# Patient Record
Sex: FEMALE | Race: WHITE | NOT HISPANIC OR LATINO | Employment: FULL TIME | URBAN - METROPOLITAN AREA
[De-identification: names, ages, dates, MRNs, and addresses within clinical notes are randomized per-mention and may not be internally consistent; named-entity substitution may affect disease eponyms.]

---

## 2017-11-28 ENCOUNTER — HOSPITAL ENCOUNTER (EMERGENCY)
Facility: MEDICAL CENTER | Age: 32
End: 2017-11-28
Attending: EMERGENCY MEDICINE

## 2017-11-28 ENCOUNTER — APPOINTMENT (OUTPATIENT)
Dept: RADIOLOGY | Facility: MEDICAL CENTER | Age: 32
End: 2017-11-28
Attending: EMERGENCY MEDICINE

## 2017-11-28 VITALS
WEIGHT: 163.14 LBS | TEMPERATURE: 96.8 F | DIASTOLIC BLOOD PRESSURE: 98 MMHG | HEIGHT: 63 IN | OXYGEN SATURATION: 96 % | SYSTOLIC BLOOD PRESSURE: 138 MMHG | HEART RATE: 77 BPM | RESPIRATION RATE: 18 BRPM | BODY MASS INDEX: 28.91 KG/M2

## 2017-11-28 DIAGNOSIS — R51.9 NONINTRACTABLE HEADACHE, UNSPECIFIED CHRONICITY PATTERN, UNSPECIFIED HEADACHE TYPE: ICD-10-CM

## 2017-11-28 PROCEDURE — 700111 HCHG RX REV CODE 636 W/ 250 OVERRIDE (IP): Performed by: EMERGENCY MEDICINE

## 2017-11-28 PROCEDURE — 96374 THER/PROPH/DIAG INJ IV PUSH: CPT

## 2017-11-28 PROCEDURE — 96375 TX/PRO/DX INJ NEW DRUG ADDON: CPT

## 2017-11-28 PROCEDURE — 700105 HCHG RX REV CODE 258: Performed by: EMERGENCY MEDICINE

## 2017-11-28 PROCEDURE — 70450 CT HEAD/BRAIN W/O DYE: CPT

## 2017-11-28 PROCEDURE — 99284 EMERGENCY DEPT VISIT MOD MDM: CPT

## 2017-11-28 RX ORDER — DIPHENHYDRAMINE HYDROCHLORIDE 50 MG/ML
50 INJECTION INTRAMUSCULAR; INTRAVENOUS ONCE
Status: COMPLETED | OUTPATIENT
Start: 2017-11-28 | End: 2017-11-28

## 2017-11-28 RX ORDER — METOCLOPRAMIDE HYDROCHLORIDE 5 MG/ML
20 INJECTION INTRAMUSCULAR; INTRAVENOUS ONCE
Status: COMPLETED | OUTPATIENT
Start: 2017-11-28 | End: 2017-11-28

## 2017-11-28 RX ORDER — DEXAMETHASONE SODIUM PHOSPHATE 4 MG/ML
10 INJECTION, SOLUTION INTRA-ARTICULAR; INTRALESIONAL; INTRAMUSCULAR; INTRAVENOUS; SOFT TISSUE ONCE
Status: COMPLETED | OUTPATIENT
Start: 2017-11-28 | End: 2017-11-28

## 2017-11-28 RX ORDER — SODIUM CHLORIDE 9 MG/ML
1000 INJECTION, SOLUTION INTRAVENOUS ONCE
Status: COMPLETED | OUTPATIENT
Start: 2017-11-28 | End: 2017-11-28

## 2017-11-28 RX ADMIN — DIPHENHYDRAMINE HYDROCHLORIDE 50 MG: 50 INJECTION, SOLUTION INTRAMUSCULAR; INTRAVENOUS at 03:27

## 2017-11-28 RX ADMIN — SODIUM CHLORIDE 1000 ML: 9 INJECTION, SOLUTION INTRAVENOUS at 03:33

## 2017-11-28 RX ADMIN — METOCLOPRAMIDE 20 MG: 5 INJECTION, SOLUTION INTRAMUSCULAR; INTRAVENOUS at 03:27

## 2017-11-28 RX ADMIN — DEXAMETHASONE SODIUM PHOSPHATE 10 MG: 4 INJECTION, SOLUTION INTRAMUSCULAR; INTRAVENOUS at 03:27

## 2017-11-28 ASSESSMENT — PAIN SCALES - GENERAL: PAINLEVEL_OUTOF10: 8

## 2017-11-28 NOTE — ED NOTES
Pt ambulates to triage, gait steady, teary-eyed.  CC HA x 2 h, no neuro deficits.  Pt has been having increasing frequency and severity of HA x 1 month - taking tyenol - no improvement.  Plan of care provided.

## 2017-11-28 NOTE — ED PROVIDER NOTES
"ED Provider Note    ED Provider Note      Primary care provider: No primary care provider on file.    CHIEF COMPLAINT  Chief Complaint   Patient presents with   • Headache     increasing pain and frequency - 3 HA in last month - no change in stress       HPI  Alba Cooper is a 32 y.o. female who presents to the Emergency Department Chief complaint of headache. Patient reports that the headache began approximately an hour and half ago it awoke her suddenly from sleep. The headache is left retrobulbar but also in the left occiput and somewhat into the left side. She reports no photophobia no phonophobia she's had minor nausea without emesis. No dizziness confusion she's had no fevers no chills. She reports that she's had a similar headache approximately 2 weeks ago that was also sudden in onset. She reports that this headache is slightly worse this evening she sought no medical care for the previous headache. Denies pregnancy no chest abdominal pelvic pain otherwise well recently.    REVIEW OF SYSTEMS  10 systems reviewed and otherwise negative, pertinent positives and negatives listed in the history of present illness.    PAST MEDICAL HISTORY   patient denies    SURGICAL HISTORY  patient denies any surgical history    SOCIAL HISTORY  Social History   Substance Use Topics   • Smoking status: Current Some Day Smoker     Types: Cigars   • Smokeless tobacco: Never Used   • Alcohol use Yes      History   Drug Use No       FAMILY HISTORY  Non-Contributory    CURRENT MEDICATIONS  Home Medications     Reviewed by Jc Aparicio R.N. (Registered Nurse) on 11/28/17 at 0258  Med List Status: Complete   Medication Last Dose Status   Magnesium Gluconate (MAGNESIUM 27 PO)  Active   POTASSIUM CHLORIDE ER PO  Active                ALLERGIES  No Known Allergies    PHYSICAL EXAM  VITAL SIGNS: /98   Pulse 74   Temp 36 °C (96.8 °F)   Resp 16   Ht 1.6 m (5' 3\")   Wt 74 kg (163 lb 2.3 oz)   LMP 11/15/2017   SpO2 " 99%   BMI 28.90 kg/m²   Pulse ox interpretation: I interpret this pulse ox as normal.  Constitutional: Alert and oriented x 3,Minimal Distress  HEENT: Atraumatic normocephalic, pupils are equal round reactive to light extraocular movements are intact. The nares is clear, external ears are normal, mouth shows moist mucous membranes  Neck: Supple, no JVD no tracheal deviation, no pain with flexion and extension or axial rotation, no meningismus  Cardiovascular: Regular rate and rhythm no murmur rub or gallop 2+ pulses peripherally x4  Thorax & Lungs: No respiratory distress, no wheezes rales or rhonchi, No chest tenderness.   GI: Soft nontender nondistended positive bowel sounds, no peritoneal signs  Skin: Warm dry no acute rash or lesion  Musculoskeletal: Moving all extremities with full range and 5 of 5 strength, no acute  deformity  Neurologic: Cranial nerves III through XII are grossly intact, no sensory deficit, no cerebellar dysfunction   Psychiatric: Appropriate affect for situation at this time      DIAGNOSTIC STUDIES / PROCEDURES      RADIOLOGY  CT-HEAD W/O   Final Result      No acute intracranial abnormality is confirmed      Left pontine hypodensity most likely is artifactual. If symptoms warrant, however, MRI could be performed to further analyze        The radiologist's interpretation of all radiological studies have been reviewed by me.    COURSE & MEDICAL DECISION MAKING  Pertinent Labs & Imaging studies reviewed. (See chart for details)    3:04 AM - Patient seen and examined at bedside.        Prescription monitoring program queried and unremarkable.    Patient noted to have slightly elevated blood pressure likely circumstantial secondary to presenting complaint. Referred to primary care physician for further evaluation.     Patient was given IV fluids based on severe headache headache       Medical Decision Making: Patient presented as above Skin unremarkable for acute intracranial hemorrhage. I  "discussed the length with patient that I felt as though lumbar puncture was indicated in this case as I did have small concern for occult bleeding or possible sentinel bleed. Patient stated that her symptoms are completely resolved and she absolutely refused to complete lumbar puncture. She understood very clearly that I felt as though this could be a possible life-threatening situation for her and provide denying this evaluation studies could be putting her O Rife at risk. She still refused to complete procedure. She's feeling better vital signs improved discharged home in stable condition.  /98   Pulse 77   Temp 36 °C (96.8 °F)   Resp 18   Ht 1.6 m (5' 3\")   Wt 74 kg (163 lb 2.3 oz)   LMP 11/15/2017   SpO2 96%   BMI 28.90 kg/m²     [facility], Emergency Dept  [address] [facility] [address] [phone]    in 12-24 hours if symptoms persist,, immediately if symptoms worsen    [address] [address]  [phone]    for establishment of primary care, and recheck      FINAL IMPRESSION  1. Nonintractable headache, unspecified chronicity pattern, unspecified headache type           This dictation has been created using voice recognition software and/or scribes. The accuracy of the dictation is limited by the abilities of the software and the expertise of the scribes. I expect there may be some errors of grammar and possibly content. I made every attempt to manually correct the errors within my dictation. However, errors related to voice recognition software and/or scribes may still exist and should be interpreted within the appropriate context.            "

## 2018-07-14 ENCOUNTER — APPOINTMENT (OUTPATIENT)
Dept: RADIOLOGY | Facility: MEDICAL CENTER | Age: 33
End: 2018-07-14
Attending: EMERGENCY MEDICINE

## 2018-07-14 ENCOUNTER — HOSPITAL ENCOUNTER (EMERGENCY)
Facility: MEDICAL CENTER | Age: 33
End: 2018-07-14
Attending: EMERGENCY MEDICINE

## 2018-07-14 VITALS
HEART RATE: 60 BPM | WEIGHT: 164.24 LBS | BODY MASS INDEX: 29.09 KG/M2 | TEMPERATURE: 97.3 F | DIASTOLIC BLOOD PRESSURE: 82 MMHG | OXYGEN SATURATION: 97 % | RESPIRATION RATE: 15 BRPM | SYSTOLIC BLOOD PRESSURE: 116 MMHG

## 2018-07-14 DIAGNOSIS — S32.2XXA CLOSED FRACTURE OF SACRUM AND COCCYX, INITIAL ENCOUNTER (HCC): ICD-10-CM

## 2018-07-14 DIAGNOSIS — S32.10XA CLOSED FRACTURE OF SACRUM AND COCCYX, INITIAL ENCOUNTER (HCC): ICD-10-CM

## 2018-07-14 LAB — HCG UR QL: NEGATIVE

## 2018-07-14 PROCEDURE — 72100 X-RAY EXAM L-S SPINE 2/3 VWS: CPT

## 2018-07-14 PROCEDURE — 72220 X-RAY EXAM SACRUM TAILBONE: CPT

## 2018-07-14 PROCEDURE — 99284 EMERGENCY DEPT VISIT MOD MDM: CPT

## 2018-07-14 PROCEDURE — 81025 URINE PREGNANCY TEST: CPT

## 2018-07-14 PROCEDURE — 73564 X-RAY EXAM KNEE 4 OR MORE: CPT | Mod: RT

## 2018-07-14 RX ORDER — HYDROCODONE BITARTRATE AND ACETAMINOPHEN 5; 325 MG/1; MG/1
1-2 TABLET ORAL EVERY 4 HOURS PRN
Qty: 15 TAB | Refills: 0 | Status: SHIPPED | OUTPATIENT
Start: 2018-07-14 | End: 2018-07-17

## 2018-07-14 NOTE — ED PROVIDER NOTES
ED Provider Note    Scribed for Xiang Alarcon M.D. by Reagan Nichole. 7/14/2018  9:28 AM    Means of arrival: Walk-in  History obtained from: Patient  History limited by: None    CHIEF COMPLAINT  Chief Complaint   Patient presents with   • Knee Pain       HPI  Alba Cooper is a 32 y.o. female who presents to the Emergency Department complaining of right knee pain that radiates to her pelvis and tailbone that began four days ago. The patient slipped on a puddle of water and fell on her tailbone five days ago. She did not experience any immediate pelvis or tailbone pain. The next morning she began to experience the pain while laying in bed. Patient has been taking Tylenol with good pain relief. The pain is usually only severe in the mornings. The pain is excruciating when sitting or laying down. Her pain is a 3-4/10 in severity when standing. Her pain is not exacerbated by walking, palpation, or bending her knee.  This morning the pain was severe enough to prompt her to present here.  She denies chest pain or shortness of breath. There is no chance that she is pregnant and her last normal menstural period was two weeks ago.    REVIEW OF SYSTEMS  Pertinent positives include knee pain, pelvis pain, and tailbone pain. Pertinent negatives include no chest pain or shortness of breath.  E    PAST MEDICAL HISTORY   None noted    SURGICAL HISTORY  patient denies any surgical history    SOCIAL HISTORY  Social History   Substance Use Topics   • Smoking status: Current Some Day Smoker     Types: Cigars   • Smokeless tobacco: Never Used   • Alcohol use Yes      History   Drug Use No       FAMILY HISTORY  History reviewed. No pertinent family history.    CURRENT MEDICATIONS  No current facility-administered medications on file prior to encounter.      Current Outpatient Prescriptions on File Prior to Encounter   Medication Sig Dispense Refill   • Magnesium Gluconate (MAGNESIUM 27 PO) Take  by mouth.     •  POTASSIUM CHLORIDE ER PO Take  by mouth.         ALLERGIES  No Known Allergies    PHYSICAL EXAM  VITAL SIGNS: /79   Pulse 69   Temp 36.4 °C (97.6 °F)   Resp 16   Wt 74.5 kg (164 lb 3.9 oz)   SpO2 95%   BMI 29.09 kg/m²     Vital signs reviewed.  Constitutional:  Appears uncomfortable, walking about room  Cardiovascular: Regular rate and rhythm  Pulmonary/Chest: Clear to auscultation  Abdominal: Soft, non tender  Musculoskeletal: Pain and tenderness over the the right sacroiliac region  Neurological: Strength and sensation intact in all four extremities.  Skin: Warm, dry, no rash    LABS  Results for orders placed or performed during the hospital encounter of 07/14/18   POC URINE PREGNANCY   Result Value Ref Range    POC Urine Pregnancy Test Negative Negative     All labs reviewed by me.    RADIOLOGY  DX-LUMBAR SPINE-2 OR 3 VIEWS   Final Result         Mild degenerative disc disease at L2-3.      DX-KNEE COMPLETE 4+ RIGHT   Final Result      No acute findings.      DX-SACRUM AND COCCYX 2+   Final Result      Slight displacement of the distal coccyx, possibly related to recent trauma.        The radiologist's interpretation of all radiological studies have been reviewed by me.    COURSE & MEDICAL DECISION MAKING  Pertinent Labs & Imaging studies reviewed. (See chart for details) The patient's [facility] and past medical  records were reviewed    9:28 AM - Patient seen and examined at bedside. Ordered DX lumbar spine 2 or 3 views, DX sacrum and coccyx 2+ views, DX knee complete 4+ right, and POC urine pregnancy to evaluate her symptoms. The differential diagnoses include but are not limited to: sciatica vs fracture vs strain    11:06 AM Review of imaging reveals a slight displacement of the distal coccyx.    11:13 AM - Re-examined; The patient is resting in bed comfortably. I discussed her above findings and plans for discharge with a prescription for Norco. We dicussed the side effects of narcotic use  including constipation and their addictive nature. She was asked to continue alternative pain medication prior to using the Norco. She was advised to purchase a donut pillow in order to provide some pain relief when sitting. She was given a referral to [facility] to establish a primary care and instructed to return to the ED if her symptoms worsen. Patient understands and agrees.    I reviewed prescription monitoring program for patient's narcotic use before prescribing a scheduled drug.The patient will not drink alcohol nor drive with prescribed medications. The patient will return for new or worsening symptoms and is stable at the time of discharge.    In prescribing controlled substances to this patient, I certify that I have obtained and reviewed the medical history of Alba Cooper. I have also made a good kj effort to obtain applicable records from other providers who have treated the patient and records did not demonstrate any increased risk of substance abuse that would prevent me from prescribing controlled substances.     I have conducted a physical exam and documented it. I have reviewed Ms. Cooper’s prescription history as maintained by the [place] Prescription Monitoring Program.     I have assessed the patient’s risk for abuse, dependency, and addiction using the validated Opioid Risk Tool available at https://www.mdcalc.com/rhhjpw-khmp-eidq-ort-narcotic-abuse.     Given the above, I believe the benefits of controlled substance therapy outweigh the risks. The reasons for prescribing controlled substances include non-narcotic, oral analgesic alternatives have been inadequate for pain control. Accordingly, I have discussed the risk and benefits, treatment plan, and alternative therapies with the patient.       DISPOSITION:  Patient will be discharged home in stable condition.    FOLLOW UP:  your doctor    In 1 week      [facility]  [address] [place] [address]  [id number]  [phone]  In 1 week  As needed      OUTPATIENT MEDICATIONS:  New Prescriptions    HYDROCODONE-ACETAMINOPHEN (NORCO) 5-325 MG TAB PER TABLET    Take 1-2 Tabs by mouth every four hours as needed for up to 3 days.       FINAL IMPRESSION  1. Closed fracture of sacrum and coccyx, initial encounter ([facility])          Reagan ROBBINS (Scribe), am scribing for, and in the presence of, Xiang Alarcon M.D..    Electronically signed by: Reagan Nichole (Scribe), 7/14/2018    IXiang M.D. personally performed the services described in this documentation, as scribed by Reagan Nichole in my presence, and it is both accurate and complete.    The note accurately reflects work and decisions made by me.  Xiang Alarcon  7/14/2018  4:40 PM

## 2018-07-14 NOTE — DISCHARGE INSTRUCTIONS
Tailbone Injury  The tailbone is the small bone at the lower end of the backbone (spine). You may have stretched tissues, bruises, or a broken bone (fracture). These injuries can be painful. Most tailbone injuries get better on their own in 4-6 weeks.  Follow these instructions at home:  · Take medicines only as told by your doctor.  · If told, apply ice to the injured area.  ¨ Put ice in a plastic bag.  ¨ Place a towel between your skin and the bag.  ¨ Leave the ice on for 20 minutes, 2-3 times per day. Do this for the first 1-2 days.  · Sit on a large, rubber or inflated ring or cushion to lessen pain. Lean forward when you sit to help lessen pain.  · Avoid sitting in one place for a long time.  · Increase your activity as the pain allows.  · Do exercises as told by your doctor or physical therapist.  · If it is painful to poop, take medicine to help you poop (stool softeners) as told by your doctor.  · Eat foods that have plenty of fiber.  · Keep all follow-up visits as told by your doctor. This is important.  Contact a doctor if:  · Your pain gets worse.  · Pooping causes you pain.  · You cannot poop (constipation).  · You are leaking pee (urinary incontinence).  · You have a fever.  This information is not intended to replace advice given to you by your health care provider. Make sure you discuss any questions you have with your health care provider.  Document Released: 01/20/2012 Document Revised: 08/17/2017 Document Reviewed: 12/14/2015  [employer] Interactive Patient Education © 2017 [employer]

## 2018-12-04 ENCOUNTER — APPOINTMENT (OUTPATIENT)
Dept: RADIOLOGY | Facility: MEDICAL CENTER | Age: 33
End: 2018-12-04
Attending: EMERGENCY MEDICINE

## 2018-12-04 ENCOUNTER — HOSPITAL ENCOUNTER (EMERGENCY)
Facility: MEDICAL CENTER | Age: 33
End: 2018-12-04
Attending: EMERGENCY MEDICINE

## 2018-12-04 VITALS
HEIGHT: 63 IN | OXYGEN SATURATION: 99 % | BODY MASS INDEX: 29.57 KG/M2 | TEMPERATURE: 98.6 F | DIASTOLIC BLOOD PRESSURE: 79 MMHG | SYSTOLIC BLOOD PRESSURE: 126 MMHG | WEIGHT: 166.89 LBS | RESPIRATION RATE: 20 BRPM | HEART RATE: 77 BPM

## 2018-12-04 DIAGNOSIS — R10.9 ABDOMINAL PAIN, UNSPECIFIED ABDOMINAL LOCATION: ICD-10-CM

## 2018-12-04 DIAGNOSIS — N30.01 ACUTE CYSTITIS WITH HEMATURIA: ICD-10-CM

## 2018-12-04 LAB
ANION GAP SERPL CALC-SCNC: 7 MMOL/L (ref 0–11.9)
APPEARANCE UR: CLEAR
B-HCG SERPL-ACNC: 744.4 MIU/ML (ref 0–5)
BACTERIA #/AREA URNS HPF: ABNORMAL /HPF
BASOPHILS # BLD AUTO: 0.3 % (ref 0–1.8)
BASOPHILS # BLD: 0.05 K/UL (ref 0–0.12)
BILIRUB UR QL STRIP.AUTO: NEGATIVE
BUN SERPL-MCNC: 16 MG/DL (ref 8–22)
CALCIUM SERPL-MCNC: 9 MG/DL (ref 8.5–10.5)
CHLORIDE SERPL-SCNC: 106 MMOL/L (ref 96–112)
CO2 SERPL-SCNC: 26 MMOL/L (ref 20–33)
COLOR UR: YELLOW
CREAT SERPL-MCNC: 0.82 MG/DL (ref 0.5–1.4)
EOSINOPHIL # BLD AUTO: 0.14 K/UL (ref 0–0.51)
EOSINOPHIL NFR BLD: 0.9 % (ref 0–6.9)
EPI CELLS #/AREA URNS HPF: ABNORMAL /HPF
ERYTHROCYTE [DISTWIDTH] IN BLOOD BY AUTOMATED COUNT: 45.5 FL (ref 35.9–50)
GLUCOSE SERPL-MCNC: 103 MG/DL (ref 65–99)
GLUCOSE UR STRIP.AUTO-MCNC: NEGATIVE MG/DL
HCT VFR BLD AUTO: 42.8 % (ref 37–47)
HGB BLD-MCNC: 14.4 G/DL (ref 12–16)
HYALINE CASTS #/AREA URNS LPF: ABNORMAL /LPF
IMM GRANULOCYTES # BLD AUTO: 0.04 K/UL (ref 0–0.11)
IMM GRANULOCYTES NFR BLD AUTO: 0.3 % (ref 0–0.9)
KETONES UR STRIP.AUTO-MCNC: ABNORMAL MG/DL
LEUKOCYTE ESTERASE UR QL STRIP.AUTO: ABNORMAL
LYMPHOCYTES # BLD AUTO: 3.64 K/UL (ref 1–4.8)
LYMPHOCYTES NFR BLD: 24.4 % (ref 22–41)
MCH RBC QN AUTO: 30.8 PG (ref 27–33)
MCHC RBC AUTO-ENTMCNC: 33.6 G/DL (ref 33.6–35)
MCV RBC AUTO: 91.6 FL (ref 81.4–97.8)
MICRO URNS: ABNORMAL
MONOCYTES # BLD AUTO: 0.79 K/UL (ref 0–0.85)
MONOCYTES NFR BLD AUTO: 5.3 % (ref 0–13.4)
MUCOUS THREADS #/AREA URNS HPF: ABNORMAL /HPF
NEUTROPHILS # BLD AUTO: 10.27 K/UL (ref 2–7.15)
NEUTROPHILS NFR BLD: 68.8 % (ref 44–72)
NITRITE UR QL STRIP.AUTO: NEGATIVE
NRBC # BLD AUTO: 0 K/UL
NRBC BLD-RTO: 0 /100 WBC
NUMBER OF RH DOSES IND 8505RD: NORMAL
PH UR STRIP.AUTO: 6 [PH]
PLATELET # BLD AUTO: 481 K/UL (ref 164–446)
PMV BLD AUTO: 8.9 FL (ref 9–12.9)
POTASSIUM SERPL-SCNC: 4 MMOL/L (ref 3.6–5.5)
PROT UR QL STRIP: NEGATIVE MG/DL
RBC # BLD AUTO: 4.67 M/UL (ref 4.2–5.4)
RBC # URNS HPF: ABNORMAL /HPF
RBC UR QL AUTO: NEGATIVE
RH BLD: NORMAL
SODIUM SERPL-SCNC: 139 MMOL/L (ref 135–145)
SP GR UR STRIP.AUTO: 1.03
UROBILINOGEN UR STRIP.AUTO-MCNC: 1 MG/DL
WBC # BLD AUTO: 14.9 K/UL (ref 4.8–10.8)
WBC #/AREA URNS HPF: ABNORMAL /HPF

## 2018-12-04 PROCEDURE — 99284 EMERGENCY DEPT VISIT MOD MDM: CPT

## 2018-12-04 PROCEDURE — 76801 OB US < 14 WKS SINGLE FETUS: CPT

## 2018-12-04 PROCEDURE — 85025 COMPLETE CBC W/AUTO DIFF WBC: CPT

## 2018-12-04 PROCEDURE — 81001 URINALYSIS AUTO W/SCOPE: CPT

## 2018-12-04 PROCEDURE — 80048 BASIC METABOLIC PNL TOTAL CA: CPT

## 2018-12-04 PROCEDURE — 86901 BLOOD TYPING SEROLOGIC RH(D): CPT

## 2018-12-04 PROCEDURE — 87086 URINE CULTURE/COLONY COUNT: CPT

## 2018-12-04 PROCEDURE — 84702 CHORIONIC GONADOTROPIN TEST: CPT

## 2018-12-04 RX ORDER — NITROFURANTOIN 25; 75 MG/1; MG/1
100 CAPSULE ORAL 2 TIMES DAILY
Qty: 10 CAP | Refills: 0 | Status: SHIPPED | OUTPATIENT
Start: 2018-12-04 | End: 2018-12-09

## 2018-12-04 ASSESSMENT — LIFESTYLE VARIABLES: DO YOU DRINK ALCOHOL: NO

## 2018-12-04 ASSESSMENT — PAIN SCALES - GENERAL: PAINLEVEL_OUTOF10: 5

## 2018-12-04 ASSESSMENT — PAIN DESCRIPTION - DESCRIPTORS: DESCRIPTORS: SHARP

## 2018-12-05 NOTE — ED TRIAGE NOTES
Alba Cooper  33 y.o.  Chief Complaint   Patient presents with   • Abdominal Pain     onset last night sharp epigastric radiating to back, reports spotting after intercourse last week otherwise no bleeding   • Pregnancy     two positive pregnancy tests at home two days ago LMP 10/22, started birth control 11/18, took plan B on 11/15 after unprotected sex   Patient ambulatory with sister to triage room with above complaint.  Patient appears in moderate distress, guarding abdomen.  Patient escorted to the lobby and instructed to notify staff of any changes in condition.

## 2018-12-05 NOTE — ED PROVIDER NOTES
"ER Provider Note     Scribed for Beny Abreu M.D. by Barb Boss. 12/4/2018, 9:25 PM.    Primary Care Provider: Pcp Pt States None  Means of Arrival: walk-in   History obtained from: Patient  History limited by: None     CHIEF COMPLAINT  Chief Complaint   Patient presents with   • Abdominal Pain     onset last night sharp epigastric radiating to back, reports spotting after intercourse last week otherwise no bleeding   • Pregnancy     two positive pregnancy tests at home two days ago LMP 10/22, started birth control 11/18, took plan B on 11/15 after unprotected sex       HPI  Alba Cooper is a 33 y.o. female who presents to the Emergency Department for evaluation of lower abdominal pain onset approximately 24 hours ago. Patient describes the pain as sharp quality and radiating to her back. She also reports some sharp pain to her clitoris. Patient states she had two positive at-home pregnancy tests 2 days ago and reports her LMP was 10/22. Patient reports having unprotected sex 11/10 and taking a plan-B pill 11/16. Patient reports drinking a few drinks everyday and drinking \"excessively\" on the weekends. Patient reports some vaginal bleeding about 7 days ago that lasted 1 hour and has since resolved. She denies any dysuria.    REVIEW OF SYSTEMS  See HPI for further details. All other systems are negative.     PAST MEDICAL HISTORY       SURGICAL HISTORY   has a past surgical history that includes dilation and curettage.    SOCIAL HISTORY  Social History   Substance Use Topics   • Smoking status: Current Some Day Smoker     Types: Cigars   • Smokeless tobacco: Never Used   • Alcohol use Yes      Comment: 3 glasses lona and coke per day      History   Drug Use   • Types: Inhaled     Comment: marijuana       FAMILY HISTORY  History reviewed. No pertinent family history.    CURRENT MEDICATIONS  No current facility-administered medications on file prior to encounter.      No current outpatient " "prescriptions on file prior to encounter.     ALLERGIES  No Known Allergies    PHYSICAL EXAM  VITAL SIGNS: /79   Pulse 77   Temp 37 °C (98.6 °F) (Temporal)   Resp 20   Ht 1.6 m (5' 3\")   Wt 75.7 kg (166 lb 14.2 oz)   LMP 10/22/2018 (Within Days)   SpO2 99%   BMI 29.56 kg/m²       Constitutional: Alert in no apparent distress.  HENT: Normocephalic, Atraumatic, Bilateral external ears normal. Nose normal.   Eyes: Pupils are equal and reactive. Conjunctiva normal, non-icteric.   Heart: Regular rate and rythm, no murmurs.    Abdomen: Mild suprapubic tenderness.  Lungs: Clear to auscultation bilaterally.  Skin: Warm, Dry, No erythema, No rash.   Neurologic: Alert, Grossly non-focal.   Psychiatric: Affect normal, Judgment normal, Mood normal, Appears appropriate and not intoxicated.     LABS  Labs Reviewed   CBC WITH DIFFERENTIAL - Abnormal; Notable for the following:        Result Value    WBC 14.9 (*)     Platelet Count 481 (*)     MPV 8.9 (*)     Neutrophils (Absolute) 10.27 (*)     All other components within normal limits   BASIC METABOLIC PANEL - Abnormal; Notable for the following:     Glucose 103 (*)     All other components within normal limits   HCG QUANTITATIVE - Abnormal; Notable for the following:     Bhcg 744.4 (*)     All other components within normal limits   URINALYSIS,CULTURE IF INDICATED - Abnormal; Notable for the following:     Ketones Trace (*)     Leukocyte Esterase Small (*)     All other components within normal limits   URINE MICROSCOPIC (W/UA) - Abnormal; Notable for the following:     WBC 20-50 (*)     RBC 2-5 (*)     Bacteria Few (*)     Hyaline Cast 11-20 (*)     All other components within normal limits   RH TYPE FOR RHOGAM FROM E.D.   ESTIMATED GFR   URINE CULTURE(NEW)      All labs reviewed by me.    RADIOLOGY  US-OB 1ST TRIMESTER WITH TRANSVAGINAL (COMBO)   Final Result      Cystic structure identified in the endometrial cavity without evidence of yolk sac or fetal pole. " This could represent an early normal intrauterine pregnancy or perhaps a blighted ovum. Pseudosac of ectopic cannot entirely be excluded.      No evidence of adnexal mass or free fluid.        The radiologist's interpretation of all radiological studies have been reviewed by me.    COURSE & MEDICAL DECISION MAKING  Pertinent Labs & Imaging studies reviewed. (See chart for details)    This is a 33 y.o. female that presents with abdominal pain in the context of 2 + pregnancy tests.  I will sure the patient does not have a urinary tract infection.  I will get an ultrasound to evaluate for ectopic pregnancy.  Also get a hCG quant and Rh status to evaluate the patient's pain complaints..     9:25 PM - Patient seen and examined at bedside. Ordered US-transvaginal with first trimester, CBC, BMP, beta-HCG liset, ABO and RH.    9:34 PM - UA culture ordered.     10:00 PM - Ordered urine microscopic and estimated gfr.    11:47 PM - I spoke with the patient and informed her of lab results and plan to discharge home with antibiotics. Patient was comfortable with discharge home.    The patient was found to have urinary tract infection therefore we will treat this.  The patient's ultrasound shows a cystic structure in the endometrial cavity but pseudo-sac of ectopic cannot be entirely excluded.  There is the patient's leukocytosis of 14.  The patient's beta-hCG is 744.  There is bacteria in the urine.  At this time I will treat the patient for urinary tract infection.  In addition I spoke to Dr. Le who will be following the patient up after a 48-hour hCG is obtained.  I spoke to the patient about all of these issues give her strict return precautions.       The patient will return for new or worsening symptoms and is stable at the time of discharge.    DISPOSITION:  Patient will be discharged home in stable condition.    FOLLOW UP:  [facility]  [address] [place] [id number]  [phone]    In 2  days        OUTPATIENT MEDICATIONS:  New Prescriptions    NITROFURANTOIN MONOHYDR MACRO (MACROBID) 100 MG CAP    Take 1 Cap by mouth 2 times a day for 5 days.         FINAL IMPRESSION  1. Abdominal pain, unspecified abdominal location    2. Acute cystitis with hematuria          Barb ROBBINS (Debbi), am scribing for, and in the presence of, Beny Abreu M.D..    Electronically signed by: Barb Boss (Debbi), 12/4/2018    IBeny M.D. personally performed the services described in this documentation, as scribed by Barb Boss in my presence, and it is both accurate and complete. C.    The note accurately reflects work and decisions made by me.  Beny Abreu  12/5/2018  2:25 AM

## 2018-12-07 LAB
BACTERIA UR CULT: NORMAL
SIGNIFICANT IND 70042: NORMAL
SITE SITE: NORMAL
SOURCE SOURCE: NORMAL

## 2018-12-10 ENCOUNTER — HOSPITAL ENCOUNTER (EMERGENCY)
Facility: MEDICAL CENTER | Age: 33
End: 2018-12-10
Attending: EMERGENCY MEDICINE

## 2018-12-10 ENCOUNTER — APPOINTMENT (OUTPATIENT)
Dept: RADIOLOGY | Facility: MEDICAL CENTER | Age: 33
End: 2018-12-10
Attending: EMERGENCY MEDICINE

## 2018-12-10 VITALS
HEIGHT: 63 IN | BODY MASS INDEX: 29.41 KG/M2 | WEIGHT: 166.01 LBS | OXYGEN SATURATION: 99 % | TEMPERATURE: 98.7 F | SYSTOLIC BLOOD PRESSURE: 145 MMHG | HEART RATE: 70 BPM | DIASTOLIC BLOOD PRESSURE: 100 MMHG | RESPIRATION RATE: 17 BRPM

## 2018-12-10 DIAGNOSIS — O03.9 MISCARRIAGE: ICD-10-CM

## 2018-12-10 LAB
ANION GAP SERPL CALC-SCNC: 7 MMOL/L (ref 0–11.9)
APPEARANCE UR: CLEAR
B-HCG SERPL-ACNC: 338.3 MIU/ML (ref 0–10)
BACTERIA #/AREA URNS HPF: ABNORMAL /HPF
BASOPHILS # BLD AUTO: 0.5 % (ref 0–1.8)
BASOPHILS # BLD: 0.06 K/UL (ref 0–0.12)
BILIRUB UR QL STRIP.AUTO: NEGATIVE
BUN SERPL-MCNC: 12 MG/DL (ref 8–22)
CALCIUM SERPL-MCNC: 8.7 MG/DL (ref 8.4–10.2)
CHLORIDE SERPL-SCNC: 106 MMOL/L (ref 96–112)
CO2 SERPL-SCNC: 25 MMOL/L (ref 20–33)
COLOR UR: YELLOW
CREAT SERPL-MCNC: 0.81 MG/DL (ref 0.5–1.4)
EOSINOPHIL # BLD AUTO: 0.21 K/UL (ref 0–0.51)
EOSINOPHIL NFR BLD: 1.9 % (ref 0–6.9)
EPI CELLS #/AREA URNS HPF: ABNORMAL /HPF
ERYTHROCYTE [DISTWIDTH] IN BLOOD BY AUTOMATED COUNT: 43.8 FL (ref 35.9–50)
GLUCOSE SERPL-MCNC: 92 MG/DL (ref 65–99)
GLUCOSE UR STRIP.AUTO-MCNC: NEGATIVE MG/DL
HCT VFR BLD AUTO: 42.1 % (ref 37–47)
HGB BLD-MCNC: 14.3 G/DL (ref 12–16)
IMM GRANULOCYTES # BLD AUTO: 0.04 K/UL (ref 0–0.11)
IMM GRANULOCYTES NFR BLD AUTO: 0.4 % (ref 0–0.9)
KETONES UR STRIP.AUTO-MCNC: NEGATIVE MG/DL
LEUKOCYTE ESTERASE UR QL STRIP.AUTO: NEGATIVE
LYMPHOCYTES # BLD AUTO: 3.14 K/UL (ref 1–4.8)
LYMPHOCYTES NFR BLD: 27.8 % (ref 22–41)
MCH RBC QN AUTO: 30.8 PG (ref 27–33)
MCHC RBC AUTO-ENTMCNC: 34 G/DL (ref 33.6–35)
MCV RBC AUTO: 90.7 FL (ref 81.4–97.8)
MICRO URNS: ABNORMAL
MONOCYTES # BLD AUTO: 0.53 K/UL (ref 0–0.85)
MONOCYTES NFR BLD AUTO: 4.7 % (ref 0–13.4)
MUCOUS THREADS #/AREA URNS HPF: ABNORMAL /HPF
NEUTROPHILS # BLD AUTO: 7.33 K/UL (ref 2–7.15)
NEUTROPHILS NFR BLD: 64.7 % (ref 44–72)
NITRITE UR QL STRIP.AUTO: NEGATIVE
NRBC # BLD AUTO: 0 K/UL
NRBC BLD-RTO: 0 /100 WBC
NUMBER OF RH DOSES IND 8505RD: NORMAL
PH UR STRIP.AUTO: 5.5 [PH]
PLATELET # BLD AUTO: 458 K/UL (ref 164–446)
PMV BLD AUTO: 8.8 FL (ref 9–12.9)
POTASSIUM SERPL-SCNC: 3.5 MMOL/L (ref 3.6–5.5)
PROT UR QL STRIP: NEGATIVE MG/DL
RBC # BLD AUTO: 4.64 M/UL (ref 4.2–5.4)
RBC # URNS HPF: ABNORMAL /HPF
RBC UR QL AUTO: ABNORMAL
RH BLD: NORMAL
SODIUM SERPL-SCNC: 138 MMOL/L (ref 135–145)
SP GR UR REFRACTOMETRY: 1.02
UNIDENT CRYS URNS QL MICRO: ABNORMAL /HPF
WBC # BLD AUTO: 11.3 K/UL (ref 4.8–10.8)
WBC #/AREA URNS HPF: ABNORMAL /HPF

## 2018-12-10 PROCEDURE — 81001 URINALYSIS AUTO W/SCOPE: CPT

## 2018-12-10 PROCEDURE — 84702 CHORIONIC GONADOTROPIN TEST: CPT

## 2018-12-10 PROCEDURE — 99284 EMERGENCY DEPT VISIT MOD MDM: CPT

## 2018-12-10 PROCEDURE — 86901 BLOOD TYPING SEROLOGIC RH(D): CPT

## 2018-12-10 PROCEDURE — 36415 COLL VENOUS BLD VENIPUNCTURE: CPT

## 2018-12-10 PROCEDURE — 76817 TRANSVAGINAL US OBSTETRIC: CPT

## 2018-12-10 PROCEDURE — 85025 COMPLETE CBC W/AUTO DIFF WBC: CPT

## 2018-12-10 PROCEDURE — 80048 BASIC METABOLIC PNL TOTAL CA: CPT

## 2018-12-10 PROCEDURE — 700105 HCHG RX REV CODE 258: Performed by: EMERGENCY MEDICINE

## 2018-12-10 RX ORDER — SODIUM CHLORIDE 9 MG/ML
1000 INJECTION, SOLUTION INTRAVENOUS ONCE
Status: COMPLETED | OUTPATIENT
Start: 2018-12-10 | End: 2018-12-10

## 2018-12-10 RX ADMIN — SODIUM CHLORIDE 1000 ML: 9 INJECTION, SOLUTION INTRAVENOUS at 15:54

## 2018-12-10 ASSESSMENT — PAIN SCALES - GENERAL
PAINLEVEL_OUTOF10: 8
PAINLEVEL_OUTOF10: 5

## 2018-12-10 NOTE — ED PROVIDER NOTES
ED Provider Note    CHIEF COMPLAINT  Chief Complaint   Patient presents with   • Abdominal Cramping     x 24 hrs   • Vaginal Bleeding        HPI  Alba Cooper is a 33 y.o. female who presents To the ED complaining of abdominal pain vaginal bleeding.  The patient states she is proximate 6 weeks pregnant she is a  female.  Patient describing more cramping.  The patient is having bleeding she states that she is going through a singular of her thin pads about an hour but it seems to be a spot in the middle of it.  Patient denies any fevers chills nausea vomiting just describes severe cramping.  Patient failed to relate that she was seen at the [facility] hospital 5 days ago.    REVIEW OF SYSTEMS  See HPI for further details. All other systems are negative.     PAST MEDICAL HISTORY  No past medical history on file.    FAMILY HISTORY  No family history on file.  Patient's family history has been discussed and is been found to be noncontributory to his present illness  SOCIAL HISTORY  Social History     Social History   • Marital status: Single     Spouse name: N/A   • Number of children: N/A   • Years of education: N/A     Social History Main Topics   • Smoking status: Current Some Day Smoker     Types: Cigars   • Smokeless tobacco: Never Used   • Alcohol use Yes      Comment: 3 glasses lona and coke per day   • Drug use: Yes     Types: Inhaled      Comment: marijuana   • Sexual activity: Not on file     Other Topics Concern   • Not on file     Social History Narrative   • No narrative on file      Pcp Pt States None        SURGICAL HISTORY  Past Surgical History:   Procedure Laterality Date   • DILATION AND CURETTAGE         CURRENT MEDICATIONS  Home Medications     Reviewed by Gómez Calderon (Pharmacy Tech) on 12/10/18 at 1506  Med List Status: Complete   Medication Last Dose Status   MAGNESIUM PO 2018 Active   POTASSIUM PO 2018 Active              No current facility-administered medications  "on file prior to encounter.      No current outpatient prescriptions on file prior to encounter.         ALLERGIES  No Known Allergies    PHYSICAL EXAM  VITAL SIGNS: /100   Pulse 69   Temp 37.3 °C (99.1 °F) (Temporal)   Resp 18   Ht 1.6 m (5' 3\")   Wt 75.3 kg (166 lb 0.1 oz)   LMP 10/22/2018 (Within Days)   SpO2 97%   BMI 29.41 kg/m²    Pulse Oximetry was obtained. It showed a reading of Pulse Oximetry: 97 %.  I interpreted this as nonhypoxic.     Constitutional: Well developed, Well nourished, No acute distress, Non-toxic appearance.   HENT: Normocephalic, Atraumatic, Bilateral external ears normal, bilateral tympanic membranes normal, Oropharynx dry mucous membranes, No oral exudates, Nose normal.   Eyes: Pupils are equal round and react to light, extraocular motions are intact, conjunctiva is normal, there are no signs of exudate.   Neck: Supple, no cervical lymphadenopathy, no meningeal signs..   Lymphatic: No lymphadenopathy noted.   Cardiovascular: Regular rate and rhythm without murmurs gallops or rubs.   Thorax & Lungs: Lungs are clear to auscultation bilaterally, there are no wheezes no rales. Chest wall is nontender.  Abdomen: Soft mildly tender suprapubic area otherwise nontender bowel sounds are present.  Skin: Warm, Dry, No erythema,   Back: No tenderness, No CVA tenderness.   Extremities: Intact distal pulses, no clubbing, no cyanosis, no edema, nontender.  Neurologic: Alert & oriented x 3, Normal motor function, Normal sensory function, No focal deficits noted.         RADIOLOGY/PROCEDURES  US-OB TRANSVAGINAL ONLY   Final Result      Small hypoechoic focus seen within the endometrial cavity of the lower uterine segment possibly representing irregular shaped gestational sac which measures 5 weeks 2 days gestation. A fetal pole was not seen as yet. This may represent impending     however in the absence of a fetal pole, ectopic pregnancy cannot be completely excluded. Correlation " with quantitative beta hCG and follow-up ultrasound is recommended.        Results for orders placed or performed during the hospital encounter of 12/10/18   CBC WITH DIFFERENTIAL   Result Value Ref Range    WBC 11.3 (H) 4.8 - 10.8 K/uL    RBC 4.64 4.20 - 5.40 M/uL    Hemoglobin 14.3 12.0 - 16.0 g/dL    Hematocrit 42.1 37.0 - 47.0 %    MCV 90.7 81.4 - 97.8 fL    MCH 30.8 27.0 - 33.0 pg    MCHC 34.0 33.6 - 35.0 g/dL    RDW 43.8 35.9 - 50.0 fL    Platelet Count 458 (H) 164 - 446 K/uL    MPV 8.8 (L) 9.0 - 12.9 fL    Neutrophils-Polys 64.70 44.00 - 72.00 %    Lymphocytes 27.80 22.00 - 41.00 %    Monocytes 4.70 0.00 - 13.40 %    Eosinophils 1.90 0.00 - 6.90 %    Basophils 0.50 0.00 - 1.80 %    Immature Granulocytes 0.40 0.00 - 0.90 %    Nucleated RBC 0.00 /100 WBC    Neutrophils (Absolute) 7.33 (H) 2.00 - 7.15 K/uL    Lymphs (Absolute) 3.14 1.00 - 4.80 K/uL    Monos (Absolute) 0.53 0.00 - 0.85 K/uL    Eos (Absolute) 0.21 0.00 - 0.51 K/uL    Baso (Absolute) 0.06 0.00 - 0.12 K/uL    Immature Granulocytes (abs) 0.04 0.00 - 0.11 K/uL    NRBC (Absolute) 0.00 K/uL   BASIC METABOLIC PANEL   Result Value Ref Range    Sodium 138 135 - 145 mmol/L    Potassium 3.5 (L) 3.6 - 5.5 mmol/L    Chloride 106 96 - 112 mmol/L    Co2 25 20 - 33 mmol/L    Glucose 92 65 - 99 mg/dL    Bun 12 8 - 22 mg/dL    Creatinine 0.81 0.50 - 1.40 mg/dL    Calcium 8.7 8.4 - 10.2 mg/dL    Anion Gap 7.0 0.0 - 11.9   HCG QUANTITATIVE SERUM   Result Value Ref Range    Bhcg 338.3 (H) 0.0 - 10.0 mIU/mL   RH TYPE FOR RHOGAM FROM E.D.   Result Value Ref Range    Emergency Department Rh Typing POS     Number Of Rh Doses Indicated ZERO    ESTIMATED GFR   Result Value Ref Range    GFR If African American >60 >60 mL/min/1.73 m 2    GFR If Non African American >60 >60 mL/min/1.73 m 2   URINALYSIS,CULTURE IF INDICATED   Result Value Ref Range    Micro Urine Req Microscopic     Color Yellow     Character Clear     Ph 5.5 5.0 - 8.0    Glucose Negative Negative mg/dL     Ketones Negative Negative mg/dL    Protein Negative Negative mg/dL    Bilirubin Negative Negative    Nitrite Negative Negative    Leukocyte Esterase Negative Negative    Occult Blood Large (A) Negative   REFRACTOMETER SG   Result Value Ref Range    Specific Gravity 1.024    URINE MICROSCOPIC (W/UA)   Result Value Ref Range    WBC 0-2 /hpf    RBC 0-2 /hpf    Bacteria Rare (A) None /hpf    Epithelial Cells Few Few /hpf    Mucous Threads Few /hpf    Urine Crystals Few Amorphous /hpf         COURSE & MEDICAL DECISION MAKING  Pertinent Labs & Imaging studies reviewed. (See chart for details)  Patient presents for evaluation.  Clinically she is having abdominal cramping has signs consistent with dehydration patient has been unable to tolerate p.o. fluids.  IV was started the patient was given a liter bolus of normal saline.  Recheck on the patient patient is feeling improved cramping is still there but improved.  At this point the patient's beta-hCG went from 700s down to 400s.  There is still what appears to be residual pregnancy so I do feel that this is an incomplete miscarriage.  Patient does have an appointment with a gynecologist in 3 days on Thursday.  Recommended to follow-up for further outpatient treatment and care.  The patient has been advised that we do not have any gynecologic services here at [facility] that if she were to have any significantly worsening bleeding to follow-up with a [facility].    FINAL IMPRESSION  1. Miscarriage          Electronically signed by: Sam Manriquez, 12/10/2018 2:57 PM

## 2018-12-10 NOTE — ED TRIAGE NOTES
Pt states seen last week for uti and hasn't started meds for same. Returned to waiting room pending bed availability. Encouraged to notify staff of chgs iin status

## 2018-12-10 NOTE — ED TRIAGE NOTES
Pt to er with c/o abd cramping and vag bleeding since yesterday. Stats 5th preg, with 3 deliveries. Pt tearful in triage

## 2018-12-10 NOTE — ED NOTES
Med rec updated and complete  Allergies reviewed  Pt reports no prescription medications.  Pt reports no antibiotics in the last 30 days.

## 2019-03-29 NOTE — ED NOTES
Discharge instructions given to patient; patient verbalized understanding. PIV discontinued w/out complication. Patient's VS WNL upon discharge. Patient ambulatory w/ steady gait upon leaving ED to get an Uber ride home.    Is This A New Presentation, Or A Follow-Up?: Skin Lesion What Type Of Note Output Would You Prefer (Optional)?: Bullet Format How Severe Is Your Skin Lesion?: moderate Has Your Skin Lesion Been Treated?: not been treated

## 2019-12-07 ENCOUNTER — HOSPITAL ENCOUNTER (EMERGENCY)
Facility: MEDICAL CENTER | Age: 34
End: 2019-12-07
Attending: EMERGENCY MEDICINE

## 2019-12-07 VITALS
DIASTOLIC BLOOD PRESSURE: 80 MMHG | OXYGEN SATURATION: 97 % | WEIGHT: 174.16 LBS | BODY MASS INDEX: 29.73 KG/M2 | RESPIRATION RATE: 16 BRPM | HEART RATE: 85 BPM | TEMPERATURE: 98.6 F | HEIGHT: 64 IN | SYSTOLIC BLOOD PRESSURE: 128 MMHG

## 2019-12-07 DIAGNOSIS — B96.89 BV (BACTERIAL VAGINOSIS): ICD-10-CM

## 2019-12-07 DIAGNOSIS — N76.0 BV (BACTERIAL VAGINOSIS): ICD-10-CM

## 2019-12-07 DIAGNOSIS — N39.0 URINARY TRACT INFECTION WITHOUT HEMATURIA, SITE UNSPECIFIED: ICD-10-CM

## 2019-12-07 DIAGNOSIS — N89.8 VAGINAL DISCHARGE: ICD-10-CM

## 2019-12-07 LAB
ALBUMIN SERPL BCP-MCNC: 4.5 G/DL (ref 3.2–4.9)
ALBUMIN/GLOB SERPL: 1.5 G/DL
ALP SERPL-CCNC: 59 U/L (ref 30–99)
ALT SERPL-CCNC: 15 U/L (ref 2–50)
ANION GAP SERPL CALC-SCNC: 12 MMOL/L (ref 0–11.9)
APPEARANCE UR: ABNORMAL
AST SERPL-CCNC: 15 U/L (ref 12–45)
BACTERIA #/AREA URNS HPF: ABNORMAL /HPF
BACTERIA GENITAL QL WET PREP: NORMAL
BASOPHILS # BLD AUTO: 0.3 % (ref 0–1.8)
BASOPHILS # BLD: 0.07 K/UL (ref 0–0.12)
BILIRUB SERPL-MCNC: 0.7 MG/DL (ref 0.1–1.5)
BILIRUB UR QL STRIP.AUTO: NEGATIVE
BUN SERPL-MCNC: 14 MG/DL (ref 8–22)
CALCIUM SERPL-MCNC: 8.8 MG/DL (ref 8.5–10.5)
CHLORIDE SERPL-SCNC: 105 MMOL/L (ref 96–112)
CO2 SERPL-SCNC: 23 MMOL/L (ref 20–33)
COLOR UR: YELLOW
CREAT SERPL-MCNC: 0.87 MG/DL (ref 0.5–1.4)
EOSINOPHIL # BLD AUTO: 0.01 K/UL (ref 0–0.51)
EOSINOPHIL NFR BLD: 0 % (ref 0–6.9)
EPI CELLS #/AREA URNS HPF: ABNORMAL /HPF
ERYTHROCYTE [DISTWIDTH] IN BLOOD BY AUTOMATED COUNT: 39.9 FL (ref 35.9–50)
GLOBULIN SER CALC-MCNC: 3 G/DL (ref 1.9–3.5)
GLUCOSE SERPL-MCNC: 89 MG/DL (ref 65–99)
GLUCOSE UR STRIP.AUTO-MCNC: NEGATIVE MG/DL
HCG SERPL QL: NEGATIVE
HCT VFR BLD AUTO: 39.5 % (ref 37–47)
HGB BLD-MCNC: 13.5 G/DL (ref 12–16)
HYALINE CASTS #/AREA URNS LPF: ABNORMAL /LPF
IMM GRANULOCYTES # BLD AUTO: 0.09 K/UL (ref 0–0.11)
IMM GRANULOCYTES NFR BLD AUTO: 0.4 % (ref 0–0.9)
KETONES UR STRIP.AUTO-MCNC: ABNORMAL MG/DL
LEUKOCYTE ESTERASE UR QL STRIP.AUTO: ABNORMAL
LIPASE SERPL-CCNC: 16 U/L (ref 11–82)
LYMPHOCYTES # BLD AUTO: 1.91 K/UL (ref 1–4.8)
LYMPHOCYTES NFR BLD: 8.3 % (ref 22–41)
MCH RBC QN AUTO: 30.5 PG (ref 27–33)
MCHC RBC AUTO-ENTMCNC: 34.2 G/DL (ref 33.6–35)
MCV RBC AUTO: 89.4 FL (ref 81.4–97.8)
MICRO URNS: ABNORMAL
MONOCYTES # BLD AUTO: 0.92 K/UL (ref 0–0.85)
MONOCYTES NFR BLD AUTO: 4 % (ref 0–13.4)
NEUTROPHILS # BLD AUTO: 19.91 K/UL (ref 2–7.15)
NEUTROPHILS NFR BLD: 87 % (ref 44–72)
NITRITE UR QL STRIP.AUTO: NEGATIVE
NRBC # BLD AUTO: 0 K/UL
NRBC BLD-RTO: 0 /100 WBC
PH UR STRIP.AUTO: 5.5 [PH] (ref 5–8)
PLATELET # BLD AUTO: 503 K/UL (ref 164–446)
PMV BLD AUTO: 8.5 FL (ref 9–12.9)
POTASSIUM SERPL-SCNC: 3.5 MMOL/L (ref 3.6–5.5)
PROT SERPL-MCNC: 7.5 G/DL (ref 6–8.2)
PROT UR QL STRIP: 30 MG/DL
RBC # BLD AUTO: 4.42 M/UL (ref 4.2–5.4)
RBC # URNS HPF: ABNORMAL /HPF
RBC UR QL AUTO: NEGATIVE
SIGNIFICANT IND 70042: NORMAL
SITE SITE: NORMAL
SODIUM SERPL-SCNC: 140 MMOL/L (ref 135–145)
SOURCE SOURCE: NORMAL
SP GR UR STRIP.AUTO: 1.03
UROBILINOGEN UR STRIP.AUTO-MCNC: 1 MG/DL
WBC # BLD AUTO: 22.9 K/UL (ref 4.8–10.8)
WBC #/AREA URNS HPF: ABNORMAL /HPF

## 2019-12-07 PROCEDURE — 700102 HCHG RX REV CODE 250 W/ 637 OVERRIDE(OP): Performed by: EMERGENCY MEDICINE

## 2019-12-07 PROCEDURE — 87086 URINE CULTURE/COLONY COUNT: CPT

## 2019-12-07 PROCEDURE — 81001 URINALYSIS AUTO W/SCOPE: CPT

## 2019-12-07 PROCEDURE — 99284 EMERGENCY DEPT VISIT MOD MDM: CPT

## 2019-12-07 PROCEDURE — 96372 THER/PROPH/DIAG INJ SC/IM: CPT

## 2019-12-07 PROCEDURE — 87491 CHLMYD TRACH DNA AMP PROBE: CPT

## 2019-12-07 PROCEDURE — A9270 NON-COVERED ITEM OR SERVICE: HCPCS | Performed by: EMERGENCY MEDICINE

## 2019-12-07 PROCEDURE — 700111 HCHG RX REV CODE 636 W/ 250 OVERRIDE (IP): Performed by: EMERGENCY MEDICINE

## 2019-12-07 PROCEDURE — 83690 ASSAY OF LIPASE: CPT

## 2019-12-07 PROCEDURE — 700101 HCHG RX REV CODE 250: Performed by: EMERGENCY MEDICINE

## 2019-12-07 PROCEDURE — 84703 CHORIONIC GONADOTROPIN ASSAY: CPT

## 2019-12-07 PROCEDURE — 87591 N.GONORRHOEAE DNA AMP PROB: CPT

## 2019-12-07 PROCEDURE — 80053 COMPREHEN METABOLIC PANEL: CPT

## 2019-12-07 PROCEDURE — 85025 COMPLETE CBC W/AUTO DIFF WBC: CPT

## 2019-12-07 RX ORDER — METRONIDAZOLE 500 MG/1
500 TABLET ORAL 3 TIMES DAILY
Qty: 30 TAB | Refills: 0 | Status: SHIPPED | OUTPATIENT
Start: 2019-12-07 | End: 2019-12-17

## 2019-12-07 RX ORDER — CEFTRIAXONE SODIUM 250 MG/1
250 INJECTION, POWDER, FOR SOLUTION INTRAMUSCULAR; INTRAVENOUS ONCE
Status: COMPLETED | OUTPATIENT
Start: 2019-12-07 | End: 2019-12-07

## 2019-12-07 RX ORDER — CEFDINIR 300 MG/1
300 CAPSULE ORAL 2 TIMES DAILY
Qty: 20 CAP | Refills: 0 | Status: SHIPPED | OUTPATIENT
Start: 2019-12-07 | End: 2019-12-17

## 2019-12-07 RX ORDER — AZITHROMYCIN 250 MG/1
1000 TABLET, FILM COATED ORAL ONCE
Status: COMPLETED | OUTPATIENT
Start: 2019-12-07 | End: 2019-12-07

## 2019-12-07 RX ADMIN — AZITHROMYCIN 1000 MG: 250 TABLET, FILM COATED ORAL at 22:04

## 2019-12-07 RX ADMIN — CEFTRIAXONE SODIUM 250 MG: 250 INJECTION, POWDER, FOR SOLUTION INTRAMUSCULAR; INTRAVENOUS at 22:03

## 2019-12-07 RX ADMIN — LIDOCAINE HYDROCHLORIDE 0.9 ML: 10 INJECTION, SOLUTION INFILTRATION; PERINEURAL at 22:04

## 2019-12-07 ASSESSMENT — LIFESTYLE VARIABLES: DO YOU DRINK ALCOHOL: NO

## 2019-12-07 ASSESSMENT — ENCOUNTER SYMPTOMS
ABDOMINAL PAIN: 1
NAUSEA: 0
VOMITING: 0

## 2019-12-07 NOTE — LETTER
12/9/2019               Alba Cooper  [address] [address]        Dear Alba (MR#[id number])    As we have been unable to contact you by phone, this letter is sent in regards to your, recent visit to the [facility] Emergency Department on 12/7/2019.  During the visit, tests were performed to assist the physician in a medical diagnosis.  A review of those tests requires that we notify you of the following:    Your culture test was positive for Gonorrhea, a sexually transmitted infection and clue cells. These were treated appropriately in the Emergency Department and with the antibiotics prescribed for you (metronidazole) on discharge. It is important that you continue taking your antibiotic until it is finished. You may discontinue taking cefdinir.       Based on the above findings it is recommended that you seek testing for the presence of additional sexually transmitted infections from the Health Department. Also, it is advised that you inform your sexual partner(s) within the previous 60 days of the above findings and direct them to the Health Department for testing. Should your symptoms progress, it is important that you follow up with your primary care physician, your local urgent care office, or return to the emergency department for further work up in order to prevent long term health issues.      Thank you for your cooperation in the matter.    Sincerely,  ED Culture Follow-Up Staff  Sonja Porter, PharmD    [facility] [place], Emergency Department  [address] [place], [place] [address]  [phone] (ED Culture Line)

## 2019-12-08 LAB
C TRACH DNA SPEC QL NAA+PROBE: NEGATIVE
N GONORRHOEA DNA SPEC QL NAA+PROBE: POSITIVE
SPECIMEN SOURCE: ABNORMAL

## 2019-12-08 ASSESSMENT — ENCOUNTER SYMPTOMS
FEVER: 0
CHILLS: 0

## 2019-12-08 NOTE — ED PROVIDER NOTES
ED Provider Note    Scribed for Ramandeep Beltrán M.D. by Rahul Zimmerman. 12/7/2019, 9:21 PM.    Primary care provider: Pcp Pt States None  Means of arrival: walk-in  History obtained from: patient  History limited by: None    CHIEF COMPLAINT  Chief Complaint   Patient presents with   • Abdominal Pain     For two days, improved with heat, walking makes pain worse, described as cramping, denies nausea/ vomtting       HPI  Alba Cooper is a 34 y.o. female who presents to the Emergency Department complaining of lower central abdominal pain onset 2 days ago. The patient reports that her abdominal pain has a mild cramping quality.  Last menstrual period was 3 weeks ago.  She denies any associated nausea or emesis. The patient reports her significant other was recently treated for a sexually transmitted illness and is concerned that her symptoms may be related to an infection.  She does report some vaginal discharge and dysuria.  Denies fevers, chills, nausea and vomiting.    REVIEW OF SYSTEMS  Review of Systems   Constitutional: Negative for chills and fever.   Cardiovascular: Negative for chest pain.   Gastrointestinal: Positive for abdominal pain (Lower central). Negative for nausea and vomiting.   Genitourinary:        Positive for vaginal discharge   All other systems reviewed and are negative.      PAST MEDICAL HISTORY  None pertinent    SURGICAL HISTORY   has a past surgical history that includes dilation and curettage.    SOCIAL HISTORY  Social History     Tobacco Use   • Smoking status: Current Some Day Smoker     Types: Cigars   • Smokeless tobacco: Never Used   Substance Use Topics   • Alcohol use: Yes     Comment: 3 glasses lona and coke per day   • Drug use: Yes     Types: Inhaled     Comment: marijuana      Social History     Substance and Sexual Activity   Drug Use Yes   • Types: Inhaled    Comment: marijuana       FAMILY HISTORY  None pertinent    CURRENT MEDICATIONS  Home Medications      "Reviewed by Miki Rebolledo R.N. (Registered Nurse) on 12/07/19 at 2029  Med List Status: Partial   Medication Last Dose Status   MAGNESIUM PO  Active   POTASSIUM PO  Active                ALLERGIES  No Known Allergies    PHYSICAL EXAM  VITAL SIGNS: /91   Pulse 91   Temp 37 °C (98.6 °F) (Temporal)   Resp 16   Ht 1.626 m (5' 4\")   Wt 79 kg (174 lb 2.6 oz)   SpO2 98%   BMI 29.90 kg/m²   Vitals reviewed by myself.  Physical Exam     Nursing note and vitals reviewed.  Constitutional: Well-developed and well-nourished. No acute distress.   HENT: Head is normocephalic and atraumatic.  Eyes: extra-ocular movements intact  Cardiovascular: regular rate and  regular rhythm. No murmur heard.  Pulmonary/Chest: Breath sounds normal. No wheezes or rales.   Abdominal: Soft and non-tender. No distention.  No rebound or guarding  Pelvic: Normal external genitalia.  Cervix is pink and nonfriable with scant white discharge from the cervical os  Musculoskeletal: Extremities exhibit normal range of motion without edema or tenderness.   Neurological: Awake and alert  Skin: Skin is warm and dry. No rash.     DIAGNOSTIC STUDIES /  LABS  Results for orders placed or performed during the hospital encounter of 12/07/19   CBC WITH DIFFERENTIAL   Result Value Ref Range    WBC 22.9 (H) 4.8 - 10.8 K/uL    RBC 4.42 4.20 - 5.40 M/uL    Hemoglobin 13.5 12.0 - 16.0 g/dL    Hematocrit 39.5 37.0 - 47.0 %    MCV 89.4 81.4 - 97.8 fL    MCH 30.5 27.0 - 33.0 pg    MCHC 34.2 33.6 - 35.0 g/dL    RDW 39.9 35.9 - 50.0 fL    Platelet Count 503 (H) 164 - 446 K/uL    MPV 8.5 (L) 9.0 - 12.9 fL    Neutrophils-Polys 87.00 (H) 44.00 - 72.00 %    Lymphocytes 8.30 (L) 22.00 - 41.00 %    Monocytes 4.00 0.00 - 13.40 %    Eosinophils 0.00 0.00 - 6.90 %    Basophils 0.30 0.00 - 1.80 %    Immature Granulocytes 0.40 0.00 - 0.90 %    Nucleated RBC 0.00 /100 WBC    Neutrophils (Absolute) 19.91 (H) 2.00 - 7.15 K/uL    Lymphs (Absolute) 1.91 1.00 - 4.80 K/uL    " Monos (Absolute) 0.92 (H) 0.00 - 0.85 K/uL    Eos (Absolute) 0.01 0.00 - 0.51 K/uL    Baso (Absolute) 0.07 0.00 - 0.12 K/uL    Immature Granulocytes (abs) 0.09 0.00 - 0.11 K/uL    NRBC (Absolute) 0.00 K/uL   URINALYSIS CULTURE, IF INDICATED   Result Value Ref Range    Micro Urine Req Microscopic    CHLAMYDIA & GC BY PCR   Result Value Ref Range    Source Vaginal        All labs reviewed by me.      REASSESSMENT    9:32 PM - Patient seen and examined at bedside. Discussed plan of care, including screening lab work for sexually transmitted illnesses and antibiotics to treat this. Patient agrees to the plan of care. The patient will be medicated with Zithromax 1000 mg and Rocephin 250 mg. Ordered for Wet Prep, Chlamydia & GC, CBC Diff, CMP, Lipase, UA Culture, HCG Qual Serum, and Urine microscopic to evaluate her symptoms.     10:31 PM - Patient was reevaluated at bedside. Discussed lab results with the patient and informed them that she is stable for discharge. She will be discharged with a prescription for Omnicef 300 mg and Flagyl 500 mg. She was provided with discharged instructions and the patient is agreeable and understanding to the plan for discharge.     COURSE & MEDICAL DECISION MAKING  Nursing notes, VS, PMSFHx reviewed in chart.    Patient is a 34-year-old female who comes in for evaluation of lower abdominal pain.  Differential diagnosis includes STI, bacterial vaginosis, urinary tract infection, pregnancy.  Diagnostic work-up includes labs, urinalysis and pregnancy test.    Patient's initial vitals are within normal limits, abdominal exam is benign.  Given patient's concern for STI she is empirically treated for chlamydia and gonorrhea with IM ceftriaxone and oral azithromycin.  Patient's urinalysis returns and appears to be infected, patient is also noted to be positive for bacterial vaginosis.  Labs are notable for a leukocytosis of 22,000, however patient has no other systemic symptoms concerning for  PID and no evidence of sepsis.  Given the UTI and bacterial vaginosis patient will be treated outpatient with Omnicef and Flagyl to which she is agreeable.  Patient is then given strict return precautions and discharged home in stable condition    The patient will return for new or worsening symptoms and is stable at the time of discharge.    The patient is referred to a primary physician for blood pressure management, diabetic screening, and for all other preventative health concerns.    DISPOSITION:  Patient will be discharged home in stable condition.    FOLLOW UP:  PCP            OUTPATIENT MEDICATIONS:  New Prescriptions    CEFDINIR (OMNICEF) 300 MG CAP    Take 1 Cap by mouth 2 times a day for 10 days.    METRONIDAZOLE (FLAGYL) 500 MG TAB    Take 1 Tab by mouth 3 times a day for 10 days.       FINAL IMPRESSION  1. BV (bacterial vaginosis)    2. Urinary tract infection without hematuria, site unspecified    3. Vaginal discharge          Rahul ROBBINS (Scribe), am scribing for, and in the presence of, Ramandeep Beltrán M.D..    Electronically signed by: Rahul Zimmerman (Debbi), 12/7/2019    IRamandeep M.D. personally performed the services described in this documentation, as scribed by Rahul Zimmerman in my presence, and it is both accurate and complete.    C    The note accurately reflects work and decisions made by me.  Ramandeep Beltrán  12/8/2019  5:38 AM

## 2019-12-08 NOTE — ED TRIAGE NOTES
Chief Complaint   Patient presents with   • Abdominal Pain     For two days, improved with heat, walking makes pain worse, described as cramping, denies nausea/ vomtting

## 2019-12-08 NOTE — ED NOTES
"Agree with triage assessment, no changes noted. Pt reports \"I also have lots of white discharge coming from my vagina.\" Pt reports pain of 6 out of 10. Pt hooked to monitor. Pt denies any further needs at this time. Call light within reach. Bed in low locked position. Comfort measures provided.     "

## 2019-12-09 LAB
BACTERIA UR CULT: NORMAL
SIGNIFICANT IND 70042: NORMAL
SITE SITE: NORMAL
SOURCE SOURCE: NORMAL

## 2019-12-10 NOTE — ED NOTES
"ED Positive Culture Follow-up/Notification Note:    Date: 12/9/19     Patient seen in the ED on 12/7/2019 for the below.   1. BV (bacterial vaginosis)    2. Urinary tract infection without hematuria, site unspecified    3. Vaginal discharge       Discharge Medication List as of 12/7/2019 10:26 PM      START taking these medications    Details   cefdinir (OMNICEF) 300 MG Cap Take 1 Cap by mouth 2 times a day for 10 days., Disp-20 Cap, R-0, Print Rx Paper      metroNIDAZOLE (FLAGYL) 500 MG Tab Take 1 Tab by mouth 3 times a day for 10 days., Disp-30 Tab, R-0, Print Rx Paper             Allergies: Patient has no known allergies.     Vitals:    12/07/19 2025 12/07/19 2027 12/07/19 2200   BP: 136/91  128/80   Pulse: 91  85   Resp: 16  16   Temp: 37 °C (98.6 °F)     TempSrc: Temporal     SpO2: 98%  97%   Weight:  79 kg (174 lb 2.6 oz)    Height: 1.626 m (5' 4\")         Final cultures:   Results     Procedure Component Value Units Date/Time    URINE CULTURE(NEW) [[id number]] Collected:  12/07/19 2121    Order Status:  Completed Specimen:  Urine Updated:  12/09/19 1230     Significant Indicator NEG     Source UR     Site -     Culture Result Mixed skin vivi 10-50,000 cfu/mL    Narrative:       CALL  Sheikh  ER tel. ,  CALLED  ER tel.  12/08/2019, 22:20, RB PERF. RESULTS CALLED TO: ER x [phone]    CHLAMYDIA & GC BY PCR [[id number]]  (Abnormal) Collected:  12/07/19 2135    Order Status:  Completed Specimen:  Genital Updated:  12/08/19 2219     C. trachomatis by PCR Negative     N. gonorrhoeae by PCR POSITIVE     Source Vaginal    WET PREP [[id number]] Collected:  12/07/19 2135    Order Status:  Completed Specimen:  Genital from Vaginal Updated:  12/07/19 2156     Significant Indicator NEG     Source GEN     Site VAGINAL     Wet Prep For Parasites Many clue cells seen.  No motile Trichomonas seen.  No yeast.  Rare WBCs seen.      URINALYSIS CULTURE, IF INDICATED [[id number]]  (Abnormal) Collected:  12/07/19 2121    Order Status:  " Completed Specimen:  Blood from Urine, Clean Catch Updated:  12/07/19 2134     Color Yellow     Character Cloudy     Specific Gravity 1.030     Ph 5.5     Glucose Negative mg/dL      Ketones Trace mg/dL      Protein 30 mg/dL      Bilirubin Negative     Urobilinogen, Urine 1.0     Nitrite Negative     Leukocyte Esterase Moderate     Occult Blood Negative     Micro Urine Req Microscopic          Plan:   Endocervical exam is positive for Neisseria gonorrhoeae   -Pt appropriately treated in the ED with ceftriaxone/azithromycin    Wet prep is positive for many clue cells   -Appropriately treated with metronidazole upon DC     Urine culture is negative in the setting of a positive UA - suspect this is due to Neisseria gonorrhoeae and BV concurrent infections  -Recommend DC cefdinir     Recommend abstinence x7 days from the date of treatment for STI, informing any and all sexual partners within the preceding 60 days of positive results (per notes, significant other was recently treated for STI), and seeking additional follow up testing at the Health [facility].     Attempted to contact pt x2 at the phone number listed: [phone] at [phone] - the telephone number is not in service   -Sent letter to patient to notify of positive culture result, encourage compliance with prescribed metronidazole, and provide STI education.    Sonja Porter

## 2023-02-08 ENCOUNTER — HOSPITAL ENCOUNTER (EMERGENCY)
Facility: MEDICAL CENTER | Age: 38
End: 2023-02-08
Attending: STUDENT IN AN ORGANIZED HEALTH CARE EDUCATION/TRAINING PROGRAM

## 2023-02-08 VITALS
OXYGEN SATURATION: 98 % | WEIGHT: 162.7 LBS | HEIGHT: 63 IN | TEMPERATURE: 98.1 F | HEART RATE: 78 BPM | DIASTOLIC BLOOD PRESSURE: 72 MMHG | RESPIRATION RATE: 18 BRPM | BODY MASS INDEX: 28.83 KG/M2 | SYSTOLIC BLOOD PRESSURE: 118 MMHG

## 2023-02-08 DIAGNOSIS — T14.8XXA ABRASION: ICD-10-CM

## 2023-02-08 PROCEDURE — 99284 EMERGENCY DEPT VISIT MOD MDM: CPT

## 2023-02-09 NOTE — DISCHARGE INSTRUCTIONS
Keep the cut clean and dry, if it begins to bleed again apply direct pressure for 10 minutes this does not resolve the bleeding please return for recheck.  If you develop any signs of infection including increased redness pus draining from the wound severe tenderness please return for recheck.
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
## 2023-02-09 NOTE — ED PROVIDER NOTES
"ED Provider Note    CHIEF COMPLAINT  Chief Complaint   Patient presents with    Wound Check     Reports she cut her vagina while shaving tonight and is having difficulty controlling bleeding since.       EXTERNAL RECORDS REVIEWED  Inpatient Notes most recent ED visit from 5 years ago for abdominal pain    HPI/ROS  LIMITATION TO HISTORY   Select: : None  OUTSIDE HISTORIAN(S):  None    Alba Cooper is a 37 y.o. female who presents evaluation of a bleeding mole.  Patient was shaving her pubic hair when she nicked the mole near her pubis, stated that the mole began to bleed she tried to stop the bleeding by placing a pad over the area however it continued to bleed for approximately 1 hour so she came to the ER for evaluation.  Denies any palpitations dizziness lightheadedness or syncope.  No known history of coagulopathies is not taking any blood thinners or antiplatelets.    PAST MEDICAL HISTORY       SURGICAL HISTORY   has a past surgical history that includes dilation and curettage.    FAMILY HISTORY  No family history on file.    SOCIAL HISTORY  Social History     Tobacco Use    Smoking status: Some Days     Types: Cigars    Smokeless tobacco: Never   Substance and Sexual Activity    Alcohol use: Yes     Comment: 3 glasses lona and coke per day    Drug use: Yes     Types: Inhaled     Comment: marijuana    Sexual activity: Not on file       CURRENT MEDICATIONS  Home Medications    **Home medications have not yet been reviewed for this encounter**         ALLERGIES  No Known Allergies    PHYSICAL EXAM  VITAL SIGNS: Pulse 75   Temp 36.7 °C (98.1 °F) (Temporal)   Resp 20   Ht 1.6 m (5' 3\")   Wt 73.8 kg (162 lb 11.2 oz)   SpO2 94%   BMI 28.82 kg/m²      Pulse ox interpretation: I interpret this pulse ox as normal.  VITALS - vital signs documented prior to this note have been reviewed and noted,  see EHR  GENERAL - awake and alert, no acute distress  HEENT - normocephalic, atraumatic, moist mucus " membranes  CARDIOVASCULAR - regular rate and rhythm  PULMONARY - unlabored, no respiratory distress. No audible wheezing or  stridor.  : She has a small punctate wound on her mons pubis the bleeding has resolved  NEUROLOGIC - mental status normal, speech fluid, cognition normal  MUSCULOSKELETAL -no obvious deformity or swelling  DERMATOLOGIC - warm and dry, no visible rashes  PSYCHIATRIC - normal affect, normal concentration    DIAGNOSTIC STUDIES / PROCEDURES      COURSE & MEDICAL DECISION MAKING    ED Observation Status? No; Patient does not meet criteria for ED Observation.     INITIAL ASSESSMENT, COURSE AND PLAN  Care Narrative: Patient presented for evaluation of a bleeding ball after cutting her shelf while shaving.  External  exam was performed with nurse chaperone present in the room, there is a small punctate wound on her mons pubis, though the bleeding has since resolved.  Her vital signs are stable, no concern for clinically significant blood loss at this point thus will defer labs.  Given that the bleeding has resolved do believe she is appropriate for outpatient management.  She was counseled on wound care, as well as return precautions and discharged in stable condition          ADDITIONAL PROBLEM LIST  abrasion  DISPOSITION AND DISCUSSIONS  I have discussed management of the patient with the following physicians and BRYAN's:  none    Discussion of management with other QHP or appropriate source(s): None     Escalation of care considered, and ultimately not performed:blood analysis    Barriers to care at this time, including but not limited to:  none .     Decision tools and prescription drugs considered including, but not limited to:  none .    FINAL DIAGNOSIS  1. Abrasion           Electronically signed by: Rick Peraza D.O., 2/8/2023 10:05 PM

## 2025-04-25 ENCOUNTER — APPOINTMENT (OUTPATIENT)
Dept: RADIOLOGY | Facility: MEDICAL CENTER | Age: 40
End: 2025-04-25
Attending: STUDENT IN AN ORGANIZED HEALTH CARE EDUCATION/TRAINING PROGRAM

## 2025-04-25 ENCOUNTER — HOSPITAL ENCOUNTER (EMERGENCY)
Facility: MEDICAL CENTER | Age: 40
End: 2025-04-26
Attending: STUDENT IN AN ORGANIZED HEALTH CARE EDUCATION/TRAINING PROGRAM

## 2025-04-25 DIAGNOSIS — J45.21 MILD INTERMITTENT ASTHMA WITH ACUTE EXACERBATION: Primary | ICD-10-CM

## 2025-04-25 DIAGNOSIS — R05.1 ACUTE COUGH: ICD-10-CM

## 2025-04-25 LAB
ALBUMIN SERPL BCP-MCNC: 4.1 G/DL (ref 3.2–4.9)
ALBUMIN/GLOB SERPL: 1.6 G/DL
ALP SERPL-CCNC: 56 U/L (ref 30–99)
ALT SERPL-CCNC: 19 U/L (ref 2–50)
ANION GAP SERPL CALC-SCNC: 12 MMOL/L (ref 7–16)
AST SERPL-CCNC: 19 U/L (ref 12–45)
BASOPHILS # BLD AUTO: 0.7 % (ref 0–1.8)
BASOPHILS # BLD: 0.08 K/UL (ref 0–0.12)
BILIRUB SERPL-MCNC: <0.2 MG/DL (ref 0.1–1.5)
BUN SERPL-MCNC: 15 MG/DL (ref 8–22)
CALCIUM ALBUM COR SERPL-MCNC: 8.6 MG/DL (ref 8.5–10.5)
CALCIUM SERPL-MCNC: 8.7 MG/DL (ref 8.5–10.5)
CHLORIDE SERPL-SCNC: 103 MMOL/L (ref 96–112)
CO2 SERPL-SCNC: 19 MMOL/L (ref 20–33)
CREAT SERPL-MCNC: 0.75 MG/DL (ref 0.5–1.4)
EOSINOPHIL # BLD AUTO: 0.98 K/UL (ref 0–0.51)
EOSINOPHIL NFR BLD: 8.3 % (ref 0–6.9)
ERYTHROCYTE [DISTWIDTH] IN BLOOD BY AUTOMATED COUNT: 39.7 FL (ref 35.9–50)
FLUAV RNA SPEC QL NAA+PROBE: NEGATIVE
FLUBV RNA SPEC QL NAA+PROBE: NEGATIVE
GFR SERPLBLD CREATININE-BSD FMLA CKD-EPI: 105 ML/MIN/1.73 M 2
GLOBULIN SER CALC-MCNC: 2.6 G/DL (ref 1.9–3.5)
GLUCOSE SERPL-MCNC: 91 MG/DL (ref 65–99)
HCG SERPL QL: NEGATIVE
HCT VFR BLD AUTO: 41.3 % (ref 37–47)
HGB BLD-MCNC: 14.5 G/DL (ref 12–16)
IMM GRANULOCYTES # BLD AUTO: 0.03 K/UL (ref 0–0.11)
IMM GRANULOCYTES NFR BLD AUTO: 0.3 % (ref 0–0.9)
LYMPHOCYTES # BLD AUTO: 3.65 K/UL (ref 1–4.8)
LYMPHOCYTES NFR BLD: 30.9 % (ref 22–41)
MCH RBC QN AUTO: 30.7 PG (ref 27–33)
MCHC RBC AUTO-ENTMCNC: 35.1 G/DL (ref 32.2–35.5)
MCV RBC AUTO: 87.5 FL (ref 81.4–97.8)
MONOCYTES # BLD AUTO: 0.76 K/UL (ref 0–0.85)
MONOCYTES NFR BLD AUTO: 6.4 % (ref 0–13.4)
NEUTROPHILS # BLD AUTO: 6.32 K/UL (ref 1.82–7.42)
NEUTROPHILS NFR BLD: 53.4 % (ref 44–72)
NRBC # BLD AUTO: 0 K/UL
NRBC BLD-RTO: 0 /100 WBC (ref 0–0.2)
NT-PROBNP SERPL IA-MCNC: 69 PG/ML (ref 0–125)
PLATELET # BLD AUTO: 459 K/UL (ref 164–446)
PMV BLD AUTO: 9 FL (ref 9–12.9)
POTASSIUM SERPL-SCNC: 3.4 MMOL/L (ref 3.6–5.5)
PROT SERPL-MCNC: 6.7 G/DL (ref 6–8.2)
RBC # BLD AUTO: 4.72 M/UL (ref 4.2–5.4)
RSV RNA SPEC QL NAA+PROBE: NEGATIVE
SARS-COV-2 RNA RESP QL NAA+PROBE: NOTDETECTED
SODIUM SERPL-SCNC: 134 MMOL/L (ref 135–145)
SPECIMEN SOURCE: NORMAL
TROPONIN T SERPL-MCNC: <6 NG/L (ref 6–19)
WBC # BLD AUTO: 11.8 K/UL (ref 4.8–10.8)

## 2025-04-25 PROCEDURE — 36415 COLL VENOUS BLD VENIPUNCTURE: CPT

## 2025-04-25 PROCEDURE — 700101 HCHG RX REV CODE 250: Performed by: STUDENT IN AN ORGANIZED HEALTH CARE EDUCATION/TRAINING PROGRAM

## 2025-04-25 PROCEDURE — 94664 DEMO&/EVAL PT USE INHALER: CPT

## 2025-04-25 PROCEDURE — 700105 HCHG RX REV CODE 258: Performed by: STUDENT IN AN ORGANIZED HEALTH CARE EDUCATION/TRAINING PROGRAM

## 2025-04-25 PROCEDURE — 0241U HCHG SARS-COV-2 COVID-19 NFCT DS RESP RNA 4 TRGT MIC: CPT

## 2025-04-25 PROCEDURE — 94640 AIRWAY INHALATION TREATMENT: CPT

## 2025-04-25 PROCEDURE — 84484 ASSAY OF TROPONIN QUANT: CPT

## 2025-04-25 PROCEDURE — 94760 N-INVAS EAR/PLS OXIMETRY 1: CPT

## 2025-04-25 PROCEDURE — 85025 COMPLETE CBC W/AUTO DIFF WBC: CPT

## 2025-04-25 PROCEDURE — 84703 CHORIONIC GONADOTROPIN ASSAY: CPT

## 2025-04-25 PROCEDURE — 83880 ASSAY OF NATRIURETIC PEPTIDE: CPT

## 2025-04-25 PROCEDURE — 700111 HCHG RX REV CODE 636 W/ 250 OVERRIDE (IP): Mod: JZ | Performed by: STUDENT IN AN ORGANIZED HEALTH CARE EDUCATION/TRAINING PROGRAM

## 2025-04-25 PROCEDURE — 71045 X-RAY EXAM CHEST 1 VIEW: CPT

## 2025-04-25 PROCEDURE — 96375 TX/PRO/DX INJ NEW DRUG ADDON: CPT

## 2025-04-25 PROCEDURE — 96365 THER/PROPH/DIAG IV INF INIT: CPT

## 2025-04-25 PROCEDURE — 99284 EMERGENCY DEPT VISIT MOD MDM: CPT

## 2025-04-25 PROCEDURE — 80053 COMPREHEN METABOLIC PANEL: CPT

## 2025-04-25 RX ORDER — BENZONATATE 100 MG/1
100 CAPSULE ORAL 3 TIMES DAILY PRN
Qty: 60 CAPSULE | Refills: 0 | Status: SHIPPED | OUTPATIENT
Start: 2025-04-25

## 2025-04-25 RX ORDER — METHYLPREDNISOLONE SODIUM SUCCINATE 125 MG/2ML
125 INJECTION, POWDER, LYOPHILIZED, FOR SOLUTION INTRAMUSCULAR; INTRAVENOUS ONCE
Status: COMPLETED | OUTPATIENT
Start: 2025-04-25 | End: 2025-04-25

## 2025-04-25 RX ORDER — ALBUTEROL SULFATE 5 MG/ML
5 SOLUTION RESPIRATORY (INHALATION) ONCE
Status: COMPLETED | OUTPATIENT
Start: 2025-04-25 | End: 2025-04-25

## 2025-04-25 RX ORDER — IPRATROPIUM BROMIDE AND ALBUTEROL SULFATE 2.5; .5 MG/3ML; MG/3ML
3 SOLUTION RESPIRATORY (INHALATION)
Status: DISCONTINUED | OUTPATIENT
Start: 2025-04-25 | End: 2025-04-26 | Stop reason: HOSPADM

## 2025-04-25 RX ORDER — ALBUTEROL SULFATE 90 UG/1
2 INHALANT RESPIRATORY (INHALATION) EVERY 6 HOURS PRN
Qty: 8.5 G | Refills: 3 | Status: SHIPPED | OUTPATIENT
Start: 2025-04-25

## 2025-04-25 RX ORDER — PREDNISONE 20 MG/1
40 TABLET ORAL DAILY
Qty: 8 TABLET | Refills: 0 | Status: SHIPPED | OUTPATIENT
Start: 2025-04-25 | End: 2025-04-29

## 2025-04-25 RX ORDER — MAGNESIUM SULFATE HEPTAHYDRATE 40 MG/ML
2 INJECTION, SOLUTION INTRAVENOUS ONCE
Status: COMPLETED | OUTPATIENT
Start: 2025-04-25 | End: 2025-04-26

## 2025-04-25 RX ORDER — SODIUM CHLORIDE, SODIUM LACTATE, POTASSIUM CHLORIDE, CALCIUM CHLORIDE 600; 310; 30; 20 MG/100ML; MG/100ML; MG/100ML; MG/100ML
1000 INJECTION, SOLUTION INTRAVENOUS ONCE
Status: COMPLETED | OUTPATIENT
Start: 2025-04-25 | End: 2025-04-26

## 2025-04-25 RX ADMIN — SODIUM CHLORIDE, POTASSIUM CHLORIDE, SODIUM LACTATE AND CALCIUM CHLORIDE 1000 ML: 600; 310; 30; 20 INJECTION, SOLUTION INTRAVENOUS at 22:15

## 2025-04-25 RX ADMIN — MAGNESIUM SULFATE HEPTAHYDRATE 2 G: 2 INJECTION, SOLUTION INTRAVENOUS at 23:12

## 2025-04-25 RX ADMIN — ALBUTEROL SULFATE 5 MG: 2.5 SOLUTION RESPIRATORY (INHALATION) at 23:03

## 2025-04-25 RX ADMIN — IPRATROPIUM BROMIDE AND ALBUTEROL SULFATE 3 ML: 2.5; .5 SOLUTION RESPIRATORY (INHALATION) at 22:08

## 2025-04-25 RX ADMIN — METHYLPREDNISOLONE SODIUM SUCCINATE 125 MG: 125 INJECTION, POWDER, FOR SOLUTION INTRAMUSCULAR; INTRAVENOUS at 22:15

## 2025-04-26 VITALS
HEART RATE: 94 BPM | HEIGHT: 63 IN | BODY MASS INDEX: 31.72 KG/M2 | RESPIRATION RATE: 19 BRPM | OXYGEN SATURATION: 91 % | WEIGHT: 179.01 LBS | SYSTOLIC BLOOD PRESSURE: 149 MMHG | TEMPERATURE: 98.2 F | DIASTOLIC BLOOD PRESSURE: 98 MMHG

## 2025-04-26 PROCEDURE — 96366 THER/PROPH/DIAG IV INF ADDON: CPT

## 2025-04-26 NOTE — DISCHARGE INSTRUCTIONS
You were seen and evaluated in the emergency department for your shortness of breath, it appears that you have underlying asthma and had an exacerbation of your asthma.  You received medications here with some improvement in your symptoms.  Your vital signs were largely normal here, you are not requiring any supplemental oxygen.  I will prescribe you some medications for asthma at home including up inhaler, short course of steroids and cough medications.  I have also placed a referral for you to establish with a primary care doctor.  There is no evidence of any pneumonia on your x-ray, your EKG was also largely normal and your laboratory analysis did not show any evidence of heart damage.  I do recommend following up with the above clinics or with the primary care doctor who might refer you to, should you concerns any worsening symptoms please return to the emergency department for further evaluation

## 2025-04-26 NOTE — ED PROVIDER NOTES
"ED Provider Note    CHIEF COMPLAINT  Chief Complaint   Patient presents with    Shortness of Breath     Since last Fri  Associated with nasal and chest shelley  Cough with clear mucous and post nasal GTT  Cough worse at night and unable to sleep  Some wheezing and chest tightness at night  Pt taking multiple OTC allergy meds with no relief  No fever No fever       EXTERNAL RECORDS REVIEWED  Previous ED visit from 2023 where patient was seen for a wound check after she had a laceration versus abrasion, she was discharged home to care of self.  HPI/ROS  LIMITATION TO HISTORY   Select: : None  OUTSIDE HISTORIAN(S):  none    Alba Joelle Cooper is a 39 y.o. female who presents to the emergency department with shortness of breath, cough.  Patient states that she has been having some worsening shortness of breath over the last week, mostly at night, she also has cough with some clear sputum.  Denies any fevers or chills.  Patient states she has been taken some nasal sprays and allergy medications.  States that symptoms are worse when she lies flat, at night, denies any unilateral bilateral lower extremity swelling.  She denies any associated chest pain but feels like sometimes her lungs feel \"tight.\"She denies any history of asthma or reactive airway disease in herself but does have a family history of asthma.  Patient is a non-smoker, denies any recent travel, surgery, stasis.    PAST MEDICAL HISTORY       SURGICAL HISTORY   has a past surgical history that includes dilation and curettage.    FAMILY HISTORY  History reviewed. No pertinent family history.    SOCIAL HISTORY  Social History     Tobacco Use    Smoking status: Former     Types: Cigars    Smokeless tobacco: Former     Types: Chew    Tobacco comments:     Pouches   Vaping Use    Vaping status: Former    Substances: Nicotine, THC   Substance and Sexual Activity    Alcohol use: Yes     Comment: 3 glasses lona and coke per day    Drug use: Yes     Types: Inhaled " "    Comment: marijuana    Sexual activity: Not on file       CURRENT MEDICATIONS  Home Medications    **Home medications have not yet been reviewed for this encounter**         ALLERGIES  No Known Allergies    PHYSICAL EXAM  VITAL SIGNS: BP (!) 166/105   Pulse 72   Temp 36.8 °C (98.2 °F) (Temporal)   Resp 16   Ht 1.6 m (5' 3\")   Wt 81.2 kg (179 lb 0.2 oz)   SpO2 92%   Breastfeeding No   BMI 31.71 kg/m²    Constitutional: Well developed, Well nourished, No acute distress, Non-toxic appearance.   HENT: Normocephalic, Atraumatic, Bilateral external ears normal, Oropharynx is clear mucous membranes are moist. No oral exudates or nasal discharge.   Eyes: Pupils are equal round and reactive, EOMI, Conjunctiva normal, No discharge.   Neck: Normal range of motion, No tenderness, Supple, No stridor. No meningismus.  Lymphatic: No lymphadenopathy noted.   Cardiovascular: Regular rate and rhythm without murmur rub or gallop.  Thorax & Lungs: Coarse breath sounds bilaterally with associated diffuse expiratory expiratory wheezing, no accessory muscle use   abdomen: Soft non-tender non-distended. There is no rebound or guarding. No organomegaly is appreciated. Bowel sounds are normal.  Skin: Normal without rash.   Back: No CVA or spinal tenderness.   Extremities: Intact distal pulses, No edema, No tenderness, No cyanosis, No clubbing. Capillary refill is less than 2 seconds.  Musculoskeletal: Good range of motion in all major joints. No tenderness to palpation or major deformities noted.   Neurologic: Alert & oriented x 3, Normal motor function, Normal sensory function, No focal deficits noted. Reflexes are normal.  Psychiatric: Affect normal, Judgment normal, Mood normal. There is no suicidal ideation or patient reported hallucinations.       EKG/LABS  Results for orders placed or performed during the hospital encounter of 04/25/25   HCG QUAL SERUM    Collection Time: 04/25/25 10:20 PM   Result Value Ref Range    " Beta-Hcg Qualitative Serum Negative Negative   CBC WITH DIFFERENTIAL    Collection Time: 04/25/25 10:20 PM   Result Value Ref Range    WBC 11.8 (H) 4.8 - 10.8 K/uL    RBC 4.72 4.20 - 5.40 M/uL    Hemoglobin 14.5 12.0 - 16.0 g/dL    Hematocrit 41.3 37.0 - 47.0 %    MCV 87.5 81.4 - 97.8 fL    MCH 30.7 27.0 - 33.0 pg    MCHC 35.1 32.2 - 35.5 g/dL    RDW 39.7 35.9 - 50.0 fL    Platelet Count 459 (H) 164 - 446 K/uL    MPV 9.0 9.0 - 12.9 fL    Neutrophils-Polys 53.40 44.00 - 72.00 %    Lymphocytes 30.90 22.00 - 41.00 %    Monocytes 6.40 0.00 - 13.40 %    Eosinophils 8.30 (H) 0.00 - 6.90 %    Basophils 0.70 0.00 - 1.80 %    Immature Granulocytes 0.30 0.00 - 0.90 %    Nucleated RBC 0.00 0.00 - 0.20 /100 WBC    Neutrophils (Absolute) 6.32 1.82 - 7.42 K/uL    Lymphs (Absolute) 3.65 1.00 - 4.80 K/uL    Monos (Absolute) 0.76 0.00 - 0.85 K/uL    Eos (Absolute) 0.98 (H) 0.00 - 0.51 K/uL    Baso (Absolute) 0.08 0.00 - 0.12 K/uL    Immature Granulocytes (abs) 0.03 0.00 - 0.11 K/uL    NRBC (Absolute) 0.00 K/uL   COMP METABOLIC PANEL    Collection Time: 04/25/25 10:20 PM   Result Value Ref Range    Sodium 134 (L) 135 - 145 mmol/L    Potassium 3.4 (L) 3.6 - 5.5 mmol/L    Chloride 103 96 - 112 mmol/L    Co2 19 (L) 20 - 33 mmol/L    Anion Gap 12.0 7.0 - 16.0    Glucose 91 65 - 99 mg/dL    Bun 15 8 - 22 mg/dL    Creatinine 0.75 0.50 - 1.40 mg/dL    Calcium 8.7 8.5 - 10.5 mg/dL    Correct Calcium 8.6 8.5 - 10.5 mg/dL    AST(SGOT) 19 12 - 45 U/L    ALT(SGPT) 19 2 - 50 U/L    Alkaline Phosphatase 56 30 - 99 U/L    Total Bilirubin <0.2 0.1 - 1.5 mg/dL    Albumin 4.1 3.2 - 4.9 g/dL    Total Protein 6.7 6.0 - 8.2 g/dL    Globulin 2.6 1.9 - 3.5 g/dL    A-G Ratio 1.6 g/dL   TROPONIN    Collection Time: 04/25/25 10:20 PM   Result Value Ref Range    Troponin T <6 6 - 19 ng/L   proBrain Natriuretic Peptide, NT    Collection Time: 04/25/25 10:20 PM   Result Value Ref Range    NT-proBNP 69 0 - 125 pg/mL   CoV-2, Flu A/B, And RSV by PCR (mYwindow)     Collection Time: 04/25/25 10:20 PM    Specimen: Respirate   Result Value Ref Range    Influenza virus A RNA Negative Negative    Influenza virus B, PCR Negative Negative    RSV, PCR Negative Negative    SARS-CoV-2 by PCR NotDetected     SARS-CoV-2 Source Nasal Swab    ESTIMATED GFR    Collection Time: 04/25/25 10:20 PM   Result Value Ref Range    GFR (CKD-EPI) 105 >60 mL/min/1.73 m 2       RADIOLOGY/PROCEDURES   I have independently interpreted the diagnostic imaging associated with this visit and am waiting the final reading from the radiologist.   My preliminary interpretation is as follows: No evidence of focal consolidation, pneumonia or pneumothorax    Radiologist interpretation:  DX-CHEST-PORTABLE (1 VIEW)   Final Result      No acute cardiopulmonary abnormality.             COURSE & MEDICAL DECISION MAKING    ASSESSMENT, COURSE AND PLAN  Care Narrative: Patient is a 39-year-old female presenting to the emergency department with progressively worsening shortness of breath, cough.  No fevers or chills, no sick contacts.  Patient denies any recent travel, surgery, stasis, states that her lungs sometimes feel tight.  Symptoms seem to be worse at night, worse with laying flat.  Denies any history of coronary artery disease or family history of sudden cardiac death but there is a family history of reactive airway disease.  On exam patient is afebrile, not tachycardic, saturating 92% in no respiratory distress but does have some diffuse expiratory wheezing and coarse breath sounds bilaterally, no clinical signs of DVT.  Will evaluate with CBC, CMP, troponin, BNP as well as chest x-ray, viral testing.  Will treat with nebulized asthma medications as well as steroids, fluids, reassess.    Patient reassessed after breathing treatment, still having some diffuse expiratory wheezing although work of breathing looks mildly improved.  Patient still not requiring any supplemental oxygen.  Will order additional breathing  treatments, magnesium    11:33 PM  Patient reassessed.  Patient had significant improvement in wheezing, now just has a faint expiratory wheeze, tolerating orals, patient does feel mildly jittery after albuterol.  Differential diagnose considered.  I suspect likely asthma exacerbation, patient improved with fluids, steroids, magnesium, multiple breathing treatments.  Patient is time felt stable for outpatient management, referred to primary care doctor, will be prescribed medications for her asthma, given strict return precautions and anticipatory guidance which patient understood Discharge.              ADDITIONAL PROBLEMS MANAGED  Asthma exacerbation    DISPOSITION AND DISCUSSIONS  I have discussed management of the patient with the following physicians and BRYAN's: None    Discussion of management with other [facility] or appropriate source(s): None     Escalation of care considered, and ultimately not performed:acute inpatient care management, however at this time, the patient is most appropriate for outpatient management    Barriers to care at this time, including but not limited to: Patient does not have established PCP.     Decision tools and prescription drugs considered including, but not limited to:  , Antitussives  Beta agonist, steroids    FINAL DIAGNOSIS  1. Mild intermittent asthma with acute exacerbation Acute   2. Acute cough Acute        Electronically signed by: Jayce Kamara M.D., 4/25/2025 9:47 PM